# Patient Record
Sex: MALE | URBAN - METROPOLITAN AREA
[De-identification: names, ages, dates, MRNs, and addresses within clinical notes are randomized per-mention and may not be internally consistent; named-entity substitution may affect disease eponyms.]

---

## 2022-02-07 ENCOUNTER — OFFICE VISIT (OUTPATIENT)
Dept: URBAN - METROPOLITAN AREA CLINIC 83 | Facility: CLINIC | Age: 72
End: 2022-02-07
Payer: MEDICARE

## 2022-02-07 DIAGNOSIS — H25.21 AGE-RELATED CATARACT, MORGAGNIAN TYPE, RIGHT EYE: ICD-10-CM

## 2022-02-07 DIAGNOSIS — E11.9 TYPE 2 DIABETES MELLITUS W/O COMPLICATION: ICD-10-CM

## 2022-02-07 DIAGNOSIS — H25.12 AGE-RELATED NUCLEAR CATARACT, LEFT EYE: ICD-10-CM

## 2022-02-07 DIAGNOSIS — C69.31 MALIGNANT NEOPLASM OF RIGHT CHOROID: ICD-10-CM

## 2022-02-07 DIAGNOSIS — H33.322 ROUND HOLE, LEFT EYE: Primary | ICD-10-CM

## 2022-02-07 PROCEDURE — 92134 CPTRZ OPH DX IMG PST SGM RTA: CPT | Performed by: OPHTHALMOLOGY

## 2022-02-07 PROCEDURE — 99204 OFFICE O/P NEW MOD 45 MIN: CPT | Performed by: OPHTHALMOLOGY

## 2022-02-07 ASSESSMENT — INTRAOCULAR PRESSURE
OD: 14
OS: 12

## 2022-02-07 NOTE — IMPRESSION/PLAN
Impression: Age-related nuclear cataract, left eye: H25.12. Left.  Plan: --not visually significant
--observe

## 2022-02-07 NOTE — IMPRESSION/PLAN
Impression: Age-related cataract, morgagnian type, right eye: H25.21. Right.  Plan: --mature cataract OD, no intervention recommended

## 2022-02-07 NOTE — IMPRESSION/PLAN
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  Right.
s/p plaque brachytherapy 2003 Plan: --NLP, no view to retina
--eye is comfortable
--followed in [place]

## 2022-02-07 NOTE — IMPRESSION/PLAN
Impression: Type 2 diabetes mellitus w/o complication: O38.2.  Plan: --exam reveals no evidence of retinopathy OS
--OCT shows absence of edema
--treatment not indicated at this time
--BS/BP/lipid control discussed
--continue regular f/u with PCP
--pt instructed to call with s/s On license of [facility]

## 2022-02-07 NOTE — IMPRESSION/PLAN
Impression: Round hole, left eye: H33.322. Left. s/p laser retinopexy x 2 Plan: --retina remains attached and stable
--good laser barricade at 1 and 5 o'clock
--RDW discussed F/U 6 months - Dr. Tee Cost